# Patient Record
(demographics unavailable — no encounter records)

---

## 2024-11-25 NOTE — REVIEW OF SYSTEMS
[Patient Intake Form Reviewed] : Patient intake form was reviewed [Fatigue] : fatigue [Vision Problems] : vision problems [Dyspnea on Exertion] : dyspnea on exertion [Negative] : Psychiatric [FreeTextEntry2] : Overweight [FreeTextEntry5] : ELMA [FreeTextEntry9] : Osteopenia [de-identified] : Microcalcifications of Breast Tissue [de-identified] : MTHFR Heterozygosis  [FreeTextEntry1] : Low Vit. D, Hashimoto's Thyroiditis.

## 2024-11-25 NOTE — HEALTH RISK ASSESSMENT
[Yes] : Yes [Monthly or less (1 pt)] : Monthly or less (1 point) [1 or 2 (0 pts)] : 1 or 2 (0 points) [Never (0 pts)] : Never (0 points) [No] : In the past 12 months have you used drugs other than those required for medical reasons? No [No falls in past year] : Patient reported no falls in the past year [0] : 2) Feeling down, depressed, or hopeless: Not at all (0) [PHQ-2 Negative - No further assessment needed] : PHQ-2 Negative - No further assessment needed [I have developed a follow-up plan documented below in the note.] : I have developed a follow-up plan documented below in the note. [Patient/Caregiver not ready to engage] : , patient/caregiver not ready to engage [I will adhere to the patient's wishes.] : I will adhere to the patient's wishes. [Time Spent: ___ minutes] : Time Spent: [unfilled] minutes [Never] : Never [Audit-CScore] : 1 [de-identified] : Active [de-identified] : Average [[facility]] : 7 [[id number]] : 0 [AdvancecareDate] : 11/24

## 2024-11-25 NOTE — HISTORY OF PRESENT ILLNESS
[Home] : at home, [unfilled] , at the time of the visit. [Medical Office: ([facility])___] : at the medical office located in  [Verbal consent obtained from patient] : the patient, [unfilled] [FreeTextEntry1] : F/U apt. for med renewal [de-identified] : F/U apt. for med renewal

## 2024-11-25 NOTE — PHYSICAL EXAM
[Well Nourished] : well nourished [Well Developed] : well developed [Well-Appearing] : well-appearing [Normal Sclera/Conjunctiva] : normal sclera/conjunctiva [PERRL] : pupils equal round and reactive to light [EOMI] : extraocular movements intact [Normal Outer Ear/Nose] : the outer ears and nose were normal in appearance [Normal Oropharynx] : the oropharynx was normal [No JVD] : no jugular venous distention [No Lymphadenopathy] : no lymphadenopathy [Supple] : supple [Thyroid Normal, No Nodules] : the thyroid was normal and there were no nodules present [No Respiratory Distress] : no respiratory distress  [No Accessory Muscle Use] : no accessory muscle use [Clear to Auscultation] : lungs were clear to auscultation bilaterally [Normal Rate] : normal rate  [Regular Rhythm] : with a regular rhythm [Normal S1, S2] : normal S1 and S2 [No Murmur] : no murmur heard [No Carotid Bruits] : no carotid bruits [No Abdominal Bruit] : a ~M bruit was not heard ~T in the abdomen [No Varicosities] : no varicosities [Pedal Pulses Present] : the pedal pulses are present [No Edema] : there was no peripheral edema [No Palpable Aorta] : no palpable aorta [No Extremity Clubbing/Cyanosis] : no extremity clubbing/cyanosis [Soft] : abdomen soft [Non Tender] : non-tender [Non-distended] : non-distended [No Masses] : no abdominal mass palpated [No HSM] : no HSM [Normal Bowel Sounds] : normal bowel sounds [No CVA Tenderness] : no CVA  tenderness [No Spinal Tenderness] : no spinal tenderness [No Joint Swelling] : no joint swelling [Grossly Normal Strength/Tone] : grossly normal strength/tone [No Rash] : no rash [Coordination Grossly Intact] : coordination grossly intact [No Focal Deficits] : no focal deficits [Normal Gait] : normal gait [Deep Tendon Reflexes (DTR)] : deep tendon reflexes were 2+ and symmetric [___/1] : [unfilled]/1   [___/3] : [unfilled]/3 [___/5] : [unfilled]/5 [___/4] : [unfilled]/4 [___/2] : [unfilled]/2 [___/8] : [unfilled]/8 [Normal] : Normal [Normal Affect] : the affect was normal [Normal Insight/Judgement] : insight and judgment were intact [Comprehensive Foot Exam Normal] : Right and left foot were examined and both feet are normal. No ulcers in either foot. Toes are normal and with full ROM.  Normal tactile sensation with monofilament testing throughout both feet [de-identified] : Overweight [TextBox_2] : Yes [TextBox_4] : HS [SlumsTotal] : 30

## 2024-11-25 NOTE — ASSESSMENT
[FreeTextEntry1] : HLD/Decreased GFR/Overweight/Fatigue - Diet & Exercise  Hypothyroidism - Thyroid Testing Reviewed MTHFR - Folic Acid and Homocysteine Levels. Continue Methyl Folate/B-Complex.  Low Vit. D - Vit. D. Elevated Ferritin/Iron Overload - Anemia Panel/CBC. Elevated D-Dimer - D-Dimer Level. Double Vision - Additional Labs from Ophthalmology Ordered today. Continue F/U with Neurophthalmology.   Spoke with Pt. on Phone for 15 min